# Patient Record
Sex: MALE | URBAN - METROPOLITAN AREA
[De-identification: names, ages, dates, MRNs, and addresses within clinical notes are randomized per-mention and may not be internally consistent; named-entity substitution may affect disease eponyms.]

---

## 2023-04-19 ENCOUNTER — OFFICE VISIT (OUTPATIENT)
Dept: URBAN - METROPOLITAN AREA CLINIC 71 | Facility: CLINIC | Age: 88
End: 2023-04-19
Payer: MEDICARE

## 2023-04-19 DIAGNOSIS — H16.211 EXPOSURE KERATOCONJUNCTIVITIS, RIGHT EYE: Primary | ICD-10-CM

## 2023-04-19 DIAGNOSIS — Z96.1 PRESENCE OF INTRAOCULAR LENS: ICD-10-CM

## 2023-04-19 PROCEDURE — 99202 OFFICE O/P NEW SF 15 MIN: CPT

## 2023-04-19 ASSESSMENT — INTRAOCULAR PRESSURE
OD: 7
OS: 10

## 2023-04-19 NOTE — IMPRESSION/PLAN
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

 -- Patient has hx of OD eyelid surgery -- Patient unable to completely close the OD Plan: Discussed diagnosis. Explained that his OD is more dry than the right because during normal blinking, there is incomplete closure of his eye. Patient expressed that he findings relief from using ATs. Strongly instructed patient to increase the amount of lubrication from 4x a day to 6-8x a day. Patient uses the Refresh Gel drops and ATs during the day and Refresh PM ointment at nighttime. Strongly encouraged patient to keep using lubrication drops. Patient to RTC in 2 weeks for a dry eye check and refraction. Instructed patient to RTC sooner if any issues or complications arise. Patient voiced understanding.

## 2023-04-19 NOTE — IMPRESSION/PLAN
Impression: Presence of intraocular lens: Z96.1. Plan: Appears to be stable and centered. Will continue to observe.

## 2023-05-01 ENCOUNTER — OFFICE VISIT (OUTPATIENT)
Dept: URBAN - METROPOLITAN AREA CLINIC 71 | Facility: CLINIC | Age: 88
End: 2023-05-01
Payer: MEDICARE

## 2023-05-01 DIAGNOSIS — H16.211 EXPOSURE KERATOCONJUNCTIVITIS, RIGHT EYE: ICD-10-CM

## 2023-05-01 DIAGNOSIS — M31.6 OTHER GIANT CELL ARTERITIS: Primary | ICD-10-CM

## 2023-05-01 PROCEDURE — 99212 OFFICE O/P EST SF 10 MIN: CPT

## 2023-05-01 RX ORDER — PREDNISOLONE ACETATE 10 MG/ML
1 % SUSPENSION/ DROPS OPHTHALMIC
Qty: 1 | Refills: 0 | Status: ACTIVE
Start: 2023-05-01

## 2023-05-01 ASSESSMENT — INTRAOCULAR PRESSURE
OS: 7
OD: 9

## 2023-05-01 NOTE — IMPRESSION/PLAN
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

 -- Improved Plan: Discussed diagnosis. Explained that the condition is improving but not fully resolved. Prescribed pred 1% 1 gtt TID OD only. Patient to RTC in 2 weeks for dry eye follow up with refraction.

## 2023-05-01 NOTE — IMPRESSION/PLAN
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

 -- Patient complained of getting headaches on the right temporal side of his head that have become more consistent Plan: Discussed diagnosis. Explained that I am suspicious for a condition called GCA. Ordered blood work (CBC w/diff, ESR and CRP). Strongly instructed patient to get blood work done as soon as possible. Explained to the patient that I will notify him of the results when I receive them. Patient voiced understanding.

## 2023-05-15 ENCOUNTER — OFFICE VISIT (OUTPATIENT)
Dept: URBAN - METROPOLITAN AREA CLINIC 71 | Facility: CLINIC | Age: 88
End: 2023-05-15
Payer: MEDICARE

## 2023-05-15 DIAGNOSIS — H16.211 EXPOSURE KERATOCONJUNCTIVITIS, RIGHT EYE: Primary | ICD-10-CM

## 2023-05-15 DIAGNOSIS — M31.6 OTHER GIANT CELL ARTERITIS: ICD-10-CM

## 2023-05-15 PROCEDURE — 99212 OFFICE O/P EST SF 10 MIN: CPT

## 2023-05-15 ASSESSMENT — INTRAOCULAR PRESSURE
OS: 7
OD: 7

## 2023-05-15 NOTE — IMPRESSION/PLAN
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

 -- Improved Plan: Discussed diagnosis. Explained that the condition is improving but not fully resolved. Prescribed pred 1% 1 gtt BID for 1 week then QD for 1 week then STOP. Patient declined refraction today. Patient to RTC in 1 month for dry eye check and refraction at that time.

## 2023-05-15 NOTE — IMPRESSION/PLAN
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

 -- Headaches are intermittent Plan: Discussed diagnosis. Reviewed blood work with the patient and explained that the inflammatory markers (ESR and CRP) are not elevated. 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 Patient has been seen at [facility]. It was recommended patient go back to [facility] for an evaluation. Encouraged patient to go back to [facility] for an additional evaluation.

## 2023-06-12 ENCOUNTER — OFFICE VISIT (OUTPATIENT)
Dept: URBAN - METROPOLITAN AREA CLINIC 71 | Facility: CLINIC | Age: 88
End: 2023-06-12
Payer: MEDICARE

## 2023-06-12 DIAGNOSIS — H52.4 PRESBYOPIA: ICD-10-CM

## 2023-06-12 DIAGNOSIS — H04.123 DRY EYE SYNDROME OF BILATERAL LACRIMAL GLANDS: Primary | ICD-10-CM

## 2023-06-12 PROCEDURE — 99212 OFFICE O/P EST SF 10 MIN: CPT

## 2023-06-12 ASSESSMENT — INTRAOCULAR PRESSURE
OS: 8
OD: 8

## 2023-06-12 ASSESSMENT — VISUAL ACUITY
OD: 20/40
OS: 20/25

## 2023-06-12 NOTE — IMPRESSION/PLAN
Impression: Dry eye syndrome of bilateral lacrimal glands: H04.123. Plan: Discussed diagnosis. Encouraged patient to keep up lubrication regiment as previously directed.  

RTC 6 months F/U for dry eyes

## 2023-06-12 NOTE — IMPRESSION/PLAN
Impression: Presbyopia: H52.4. Plan: New glasses MRX given in clinic today. Explained there may be an adaption period. Pt to RTC if pt has an complications arise.

## 2023-10-06 ENCOUNTER — OFFICE VISIT (OUTPATIENT)
Dept: URBAN - METROPOLITAN AREA CLINIC 71 | Facility: CLINIC | Age: 88
End: 2023-10-06
Payer: MEDICARE

## 2023-10-06 DIAGNOSIS — Z96.1 PRESENCE OF INTRAOCULAR LENS: ICD-10-CM

## 2023-10-06 DIAGNOSIS — H16.211 EXPOSURE KERATOCONJUNCTIVITIS, RIGHT EYE: Primary | ICD-10-CM

## 2023-10-06 PROCEDURE — 99213 OFFICE O/P EST LOW 20 MIN: CPT | Performed by: OPHTHALMOLOGY

## 2023-10-06 ASSESSMENT — INTRAOCULAR PRESSURE
OD: 12
OS: 12

## 2023-12-04 ENCOUNTER — OFFICE VISIT (OUTPATIENT)
Dept: URBAN - METROPOLITAN AREA CLINIC 71 | Facility: CLINIC | Age: 88
End: 2023-12-04
Payer: MEDICARE

## 2023-12-04 DIAGNOSIS — H16.211 EXPOSURE KERATOCONJUNCTIVITIS, RIGHT EYE: Primary | ICD-10-CM

## 2023-12-04 PROCEDURE — 99213 OFFICE O/P EST LOW 20 MIN: CPT | Performed by: OPHTHALMOLOGY

## 2023-12-04 ASSESSMENT — INTRAOCULAR PRESSURE
OS: 6
OD: 8